# Patient Record
Sex: MALE | Race: WHITE | NOT HISPANIC OR LATINO | URBAN - METROPOLITAN AREA
[De-identification: names, ages, dates, MRNs, and addresses within clinical notes are randomized per-mention and may not be internally consistent; named-entity substitution may affect disease eponyms.]

---

## 2022-09-30 ENCOUNTER — EMERGENCY (EMERGENCY)
Facility: HOSPITAL | Age: 27
LOS: 1 days | Discharge: HOME | End: 2022-09-30
Attending: STUDENT IN AN ORGANIZED HEALTH CARE EDUCATION/TRAINING PROGRAM | Admitting: STUDENT IN AN ORGANIZED HEALTH CARE EDUCATION/TRAINING PROGRAM
Payer: COMMERCIAL

## 2022-09-30 VITALS
HEIGHT: 74 IN | HEART RATE: 59 BPM | TEMPERATURE: 98 F | OXYGEN SATURATION: 99 % | SYSTOLIC BLOOD PRESSURE: 125 MMHG | DIASTOLIC BLOOD PRESSURE: 76 MMHG | RESPIRATION RATE: 20 BRPM | WEIGHT: 220.02 LBS

## 2022-09-30 VITALS
RESPIRATION RATE: 16 BRPM | DIASTOLIC BLOOD PRESSURE: 71 MMHG | SYSTOLIC BLOOD PRESSURE: 132 MMHG | HEART RATE: 60 BPM | OXYGEN SATURATION: 98 % | TEMPERATURE: 98 F

## 2022-09-30 PROCEDURE — 99282 EMERGENCY DEPT VISIT SF MDM: CPT

## 2022-09-30 PROCEDURE — 99283 EMERGENCY DEPT VISIT LOW MDM: CPT | Mod: 25

## 2022-09-30 PROCEDURE — 12001 RPR S/N/AX/GEN/TRNK 2.5CM/<: CPT

## 2022-09-30 PROCEDURE — 12041 INTMD RPR N-HF/GENIT 2.5CM/<: CPT

## 2022-09-30 NOTE — ED PROVIDER NOTE - PATIENT PORTAL LINK FT
You can access the FollowMyHealth Patient Portal offered by [facility] by registering at the following website: [email or web address]. By joining [facility] FollowMyHealth portal, you will also be able to view your health information using other applications (apps) compatible with our system.

## 2022-09-30 NOTE — ED ADULT TRIAGE NOTE - CHIEF COMPLAINT QUOTE
Patient complaining of laceration to left hand 3rd digit, while cutting bread, bleeding minimal at this time

## 2022-09-30 NOTE — ED PROVIDER NOTE - OBJECTIVE STATEMENT
27-year-old male otherwise healthy presents with 1 and half centimeter laceration on left middle finger patient is right-handed he was cutting bread this morning and cut his finger patient is able to flex and extend his finger and has full sensation in his finger patient last had a tetanus shot 1 week ago plan for lack repair and DC follow-up in 10 days for removal

## 2022-09-30 NOTE — ED PROVIDER NOTE - PHYSICAL EXAMINATION
Gen:  Well appearning in NAD  Head:  NC/AT  Resp: No distress   Ext: no deformities  Skin: 1,5 c, laceration 3rd finger left hand volar aspect, flexion and extension intact, sensation intact

## 2022-09-30 NOTE — ED ADULT NURSE NOTE - OBJECTIVE STATEMENT
Patient came in c/o laceration of 3rd left finger by a knife while slicing a bread this morning noted with minimal bleeding. Seen by Dr. Chou and will repair the lac.

## 2022-09-30 NOTE — ED PROVIDER NOTE - NSFOLLOWUPINSTRUCTIONS_ED_ALL_ED_FT
REturn in 10 days for suture removal  Keep wound dry for 24 hours  Apply bacitracin and change dressings daily  return to ED fr signs of infection, fevers, redness, warmth or purulent discharge     Sutured Wound Care  ImageSutures are stitches that can be used to close wounds. Taking care of your wound properly can help prevent pain and infection. It can also help your wound to heal more quickly.    How is this treated?  Wound Care     Keep the wound clean and dry.  If you were given a bandage (dressing), change it at least one time per day or as told by your doctor. You should also change it if it gets wet or dirty.  Keep the wound completely dry for the first 24 hours or as told by your doctor. After that time, you may shower or bathe. However, make sure that the wound is not soaked in water until the sutures have been removed.  Clean the wound one time each day or as told by your doctor.    Wash the wound with soap and water.  Rinse the wound with water to remove all soap.  Pat the wound dry with a clean towel. Do not rub the wound.    After cleaning the wound, put a thin layer of antibiotic ointment on it as told your doctor. This ointment:    Helps to prevent infection.  Keeps the bandage from sticking to the wound.    Have the sutures removed as told by your doctor.  General Instructions     Take or apply medicines only as told by your doctor.  To help prevent scarring, make sure to cover your wound with sunscreen whenever you are outside after the sutures are removed and the wound is healed. Make sure to wear a sunscreen of at least 30 SPF.  If you were prescribed an antibiotic medicine or ointment, finish all of it even if you start to feel better.  Do not scratch or pick at the wound.  Keep all follow-up visits as told by your doctor. This is important.  Check your wound every day for signs of infection. Watch for:    Redness, swelling, or pain.  Fluid, blood, or pus.    Raise (elevate) the injured area above the level of your heart while you are sitting or lying down, if possible.  Avoid stretching your wound.  Drink enough fluids to keep your pee (urine) clear or pale yellow.  Contact a doctor if:  You were given a tetanus shot and you have any of these where the needle went in:    Swelling.  Very bad pain.  Redness.  Bleeding.    You have a fever.  A wound that was closed breaks open.  You notice a bad smell coming from the wound.  You notice something coming out of the wound, such as wood or glass.  Medicine does not help your pain.  You have any of these at the site of the wound.    More redness.  More swelling.  More pain.    You have any of these coming from the wound.    Fluid.  Blood.  Pus.    You notice a change in the color of your skin near the wound.  You need to change the bandage often due to fluid, blood, or pus coming from the wound.  You have a new rash.  You have numbness around the wound.  Get help right away if:  You have very bad swelling around the wound.  Your pain suddenly gets worse and is very bad.  You have painful lumps near the wound or on skin that is anywhere on your body.  You have a red streak going away from the wound.  The wound is on your hand or foot and you cannot move a finger or toe like normal.  The wound is on your hand or foot and you notice that your fingers or toes look pale or bluish.  This information is not intended to replace advice given to you by your health care provider. Make sure you discuss any questions you have with your health care provider.